# Patient Record
Sex: FEMALE | Race: WHITE | NOT HISPANIC OR LATINO | Employment: UNEMPLOYED | URBAN - METROPOLITAN AREA
[De-identification: names, ages, dates, MRNs, and addresses within clinical notes are randomized per-mention and may not be internally consistent; named-entity substitution may affect disease eponyms.]

---

## 2020-10-12 LAB
ABO GROUP BLD: NORMAL
HBV SURFACE AG SERPL QL IA: NEGATIVE
HIV 1+2 AB+HIV1 P24 AG SERPL QL IA: NORMAL
RH BLD: NORMAL
RUBV IGG SERPL IA-ACNC: NORMAL
TREPONEMA PALLIDUM IGG+IGM AB [PRESENCE] IN SERUM OR PLASMA BY IMMUNOASSAY: NORMAL

## 2021-01-16 LAB
GLUCOSE 1H P CHAL SERPL-MCNC: 116 MG/DL
GLUCOSE 2H P CHAL SERPL-MCNC: 125 MG/DL
GLUCOSE 3H P CHAL SERPL-MCNC: 108 MG/DL
GLUCOSE TOL INTERP 786: 76

## 2021-04-13 LAB — STREP GP B DNA PCR: NEGATIVE

## 2021-04-29 ENCOUNTER — HOSPITAL ENCOUNTER (INPATIENT)
Facility: MEDICAL CENTER | Age: 31
LOS: 2 days | End: 2021-05-01
Attending: OBSTETRICS & GYNECOLOGY | Admitting: OBSTETRICS & GYNECOLOGY
Payer: COMMERCIAL

## 2021-04-29 ENCOUNTER — ANESTHESIA (OUTPATIENT)
Dept: ANESTHESIOLOGY | Facility: MEDICAL CENTER | Age: 31
End: 2021-04-29
Payer: COMMERCIAL

## 2021-04-29 ENCOUNTER — ANESTHESIA EVENT (OUTPATIENT)
Dept: ANESTHESIOLOGY | Facility: MEDICAL CENTER | Age: 31
End: 2021-04-29
Payer: COMMERCIAL

## 2021-04-29 ENCOUNTER — APPOINTMENT (OUTPATIENT)
Dept: OBGYN | Facility: MEDICAL CENTER | Age: 31
End: 2021-04-29
Attending: OBSTETRICS & GYNECOLOGY
Payer: COMMERCIAL

## 2021-04-29 LAB
BASOPHILS # BLD AUTO: 0.1 % (ref 0–1.8)
BASOPHILS # BLD: 0.01 K/UL (ref 0–0.12)
COMMENT 1642: NORMAL
EOSINOPHIL # BLD AUTO: 0.03 K/UL (ref 0–0.51)
EOSINOPHIL NFR BLD: 0.4 % (ref 0–6.9)
ERYTHROCYTE [DISTWIDTH] IN BLOOD BY AUTOMATED COUNT: 44.6 FL (ref 35.9–50)
HCT VFR BLD AUTO: 37.8 % (ref 37–47)
HGB BLD-MCNC: 11.4 G/DL (ref 12–16)
HOLDING TUBE BB 8507: NORMAL
IMM GRANULOCYTES # BLD AUTO: 0.04 K/UL (ref 0–0.11)
IMM GRANULOCYTES NFR BLD AUTO: 0.6 % (ref 0–0.9)
LYMPHOCYTES # BLD AUTO: 1.39 K/UL (ref 1–4.8)
LYMPHOCYTES NFR BLD: 19.2 % (ref 22–41)
MCH RBC QN AUTO: 24.9 PG (ref 27–33)
MCHC RBC AUTO-ENTMCNC: 30.2 G/DL (ref 33.6–35)
MCV RBC AUTO: 82.7 FL (ref 81.4–97.8)
MONOCYTES # BLD AUTO: 0.57 K/UL (ref 0–0.85)
MONOCYTES NFR BLD AUTO: 7.9 % (ref 0–13.4)
MORPHOLOGY BLD-IMP: NORMAL
NEUTROPHILS # BLD AUTO: 5.2 K/UL (ref 2–7.15)
NEUTROPHILS NFR BLD: 71.8 % (ref 44–72)
NRBC # BLD AUTO: 0 K/UL
NRBC BLD-RTO: 0 /100 WBC
PLATELET # BLD AUTO: 249 K/UL (ref 164–446)
PLATELET BLD QL SMEAR: NORMAL
PMV BLD AUTO: 10.2 FL (ref 9–12.9)
RBC # BLD AUTO: 4.57 M/UL (ref 4.2–5.4)
RBC BLD AUTO: NORMAL
SARS-COV+SARS-COV-2 AG RESP QL IA.RAPID: NOTDETECTED
SARS-COV-2 RNA RESP QL NAA+PROBE: NOTDETECTED
SPECIMEN SOURCE: NORMAL
SPECIMEN SOURCE: NORMAL
WBC # BLD AUTO: 7.2 K/UL (ref 4.8–10.8)

## 2021-04-29 PROCEDURE — 85025 COMPLETE CBC W/AUTO DIFF WBC: CPT

## 2021-04-29 PROCEDURE — 304965 HCHG RECOVERY SERVICES

## 2021-04-29 PROCEDURE — 10H07YZ INSERTION OF OTHER DEVICE INTO PRODUCTS OF CONCEPTION, VIA NATURAL OR ARTIFICIAL OPENING: ICD-10-PCS | Performed by: OBSTETRICS & GYNECOLOGY

## 2021-04-29 PROCEDURE — 700101 HCHG RX REV CODE 250: Performed by: STUDENT IN AN ORGANIZED HEALTH CARE EDUCATION/TRAINING PROGRAM

## 2021-04-29 PROCEDURE — 700111 HCHG RX REV CODE 636 W/ 250 OVERRIDE (IP): Performed by: OBSTETRICS & GYNECOLOGY

## 2021-04-29 PROCEDURE — 87426 SARSCOV CORONAVIRUS AG IA: CPT

## 2021-04-29 PROCEDURE — A9270 NON-COVERED ITEM OR SERVICE: HCPCS | Performed by: OBSTETRICS & GYNECOLOGY

## 2021-04-29 PROCEDURE — 36415 COLL VENOUS BLD VENIPUNCTURE: CPT

## 2021-04-29 PROCEDURE — U0005 INFEC AGEN DETEC AMPLI PROBE: HCPCS

## 2021-04-29 PROCEDURE — 700111 HCHG RX REV CODE 636 W/ 250 OVERRIDE (IP)

## 2021-04-29 PROCEDURE — 770002 HCHG ROOM/CARE - OB PRIVATE (112)

## 2021-04-29 PROCEDURE — U0003 INFECTIOUS AGENT DETECTION BY NUCLEIC ACID (DNA OR RNA); SEVERE ACUTE RESPIRATORY SYNDROME CORONAVIRUS 2 (SARS-COV-2) (CORONAVIRUS DISEASE [COVID-19]), AMPLIFIED PROBE TECHNIQUE, MAKING USE OF HIGH THROUGHPUT TECHNOLOGIES AS DESCRIBED BY CMS-2020-01-R: HCPCS

## 2021-04-29 PROCEDURE — 700102 HCHG RX REV CODE 250 W/ 637 OVERRIDE(OP): Performed by: OBSTETRICS & GYNECOLOGY

## 2021-04-29 PROCEDURE — 59409 OBSTETRICAL CARE: CPT

## 2021-04-29 PROCEDURE — 0KQM0ZZ REPAIR PERINEUM MUSCLE, OPEN APPROACH: ICD-10-PCS | Performed by: OBSTETRICS & GYNECOLOGY

## 2021-04-29 PROCEDURE — 700111 HCHG RX REV CODE 636 W/ 250 OVERRIDE (IP): Performed by: STUDENT IN AN ORGANIZED HEALTH CARE EDUCATION/TRAINING PROGRAM

## 2021-04-29 PROCEDURE — 700105 HCHG RX REV CODE 258: Performed by: OBSTETRICS & GYNECOLOGY

## 2021-04-29 PROCEDURE — 10907ZC DRAINAGE OF AMNIOTIC FLUID, THERAPEUTIC FROM PRODUCTS OF CONCEPTION, VIA NATURAL OR ARTIFICIAL OPENING: ICD-10-PCS | Performed by: OBSTETRICS & GYNECOLOGY

## 2021-04-29 RX ORDER — ACETAMINOPHEN 325 MG/1
325 TABLET ORAL EVERY 4 HOURS PRN
Status: DISCONTINUED | OUTPATIENT
Start: 2021-04-29 | End: 2021-05-01 | Stop reason: HOSPADM

## 2021-04-29 RX ORDER — MISOPROSTOL 200 UG/1
800 TABLET ORAL
Status: DISCONTINUED | OUTPATIENT
Start: 2021-04-29 | End: 2021-04-30 | Stop reason: HOSPADM

## 2021-04-29 RX ORDER — ROPIVACAINE HYDROCHLORIDE 2 MG/ML
INJECTION, SOLUTION EPIDURAL; INFILTRATION
Status: DISCONTINUED | OUTPATIENT
Start: 2021-04-29 | End: 2021-04-29 | Stop reason: SURG

## 2021-04-29 RX ORDER — LIDOCAINE HYDROCHLORIDE AND EPINEPHRINE 15; 5 MG/ML; UG/ML
INJECTION, SOLUTION EPIDURAL
Status: COMPLETED | OUTPATIENT
Start: 2021-04-29 | End: 2021-04-29

## 2021-04-29 RX ORDER — SODIUM CHLORIDE, SODIUM LACTATE, POTASSIUM CHLORIDE, AND CALCIUM CHLORIDE .6; .31; .03; .02 G/100ML; G/100ML; G/100ML; G/100ML
250 INJECTION, SOLUTION INTRAVENOUS PRN
Status: DISCONTINUED | OUTPATIENT
Start: 2021-04-29 | End: 2021-04-30 | Stop reason: HOSPADM

## 2021-04-29 RX ORDER — ROPIVACAINE HYDROCHLORIDE 2 MG/ML
INJECTION, SOLUTION EPIDURAL; INFILTRATION; PERINEURAL
Status: COMPLETED
Start: 2021-04-29 | End: 2021-04-29

## 2021-04-29 RX ORDER — ROPIVACAINE HYDROCHLORIDE 2 MG/ML
INJECTION, SOLUTION EPIDURAL; INFILTRATION PRN
Status: DISCONTINUED | OUTPATIENT
Start: 2021-04-29 | End: 2021-04-29

## 2021-04-29 RX ORDER — ONDANSETRON 4 MG/1
4 TABLET, ORALLY DISINTEGRATING ORAL EVERY 6 HOURS PRN
Status: DISCONTINUED | OUTPATIENT
Start: 2021-04-29 | End: 2021-04-30

## 2021-04-29 RX ORDER — SODIUM CHLORIDE, SODIUM LACTATE, POTASSIUM CHLORIDE, CALCIUM CHLORIDE 600; 310; 30; 20 MG/100ML; MG/100ML; MG/100ML; MG/100ML
INJECTION, SOLUTION INTRAVENOUS CONTINUOUS
Status: ACTIVE | OUTPATIENT
Start: 2021-04-29 | End: 2021-04-29

## 2021-04-29 RX ORDER — OXYCODONE HYDROCHLORIDE 5 MG/1
5 TABLET ORAL EVERY 6 HOURS PRN
Status: DISCONTINUED | OUTPATIENT
Start: 2021-04-29 | End: 2021-05-01 | Stop reason: HOSPADM

## 2021-04-29 RX ORDER — SODIUM CHLORIDE, SODIUM LACTATE, POTASSIUM CHLORIDE, AND CALCIUM CHLORIDE .6; .31; .03; .02 G/100ML; G/100ML; G/100ML; G/100ML
1000 INJECTION, SOLUTION INTRAVENOUS
Status: DISCONTINUED | OUTPATIENT
Start: 2021-04-29 | End: 2021-04-30 | Stop reason: HOSPADM

## 2021-04-29 RX ORDER — IBUPROFEN 600 MG/1
600 TABLET ORAL EVERY 6 HOURS PRN
Status: DISCONTINUED | OUTPATIENT
Start: 2021-04-29 | End: 2021-05-01 | Stop reason: HOSPADM

## 2021-04-29 RX ORDER — ONDANSETRON 2 MG/ML
4 INJECTION INTRAMUSCULAR; INTRAVENOUS EVERY 6 HOURS PRN
Status: DISCONTINUED | OUTPATIENT
Start: 2021-04-29 | End: 2021-04-30

## 2021-04-29 RX ORDER — ROPIVACAINE HYDROCHLORIDE 2 MG/ML
INJECTION, SOLUTION EPIDURAL; INFILTRATION; PERINEURAL CONTINUOUS
Status: DISCONTINUED | OUTPATIENT
Start: 2021-04-29 | End: 2021-04-30

## 2021-04-29 RX ORDER — TERBUTALINE SULFATE 1 MG/ML
0.25 INJECTION, SOLUTION SUBCUTANEOUS PRN
Status: DISCONTINUED | OUTPATIENT
Start: 2021-04-29 | End: 2021-04-30 | Stop reason: HOSPADM

## 2021-04-29 RX ORDER — DEXTROSE, SODIUM CHLORIDE, SODIUM LACTATE, POTASSIUM CHLORIDE, AND CALCIUM CHLORIDE 5; .6; .31; .03; .02 G/100ML; G/100ML; G/100ML; G/100ML; G/100ML
INJECTION, SOLUTION INTRAVENOUS CONTINUOUS
Status: DISCONTINUED | OUTPATIENT
Start: 2021-04-29 | End: 2021-04-30

## 2021-04-29 RX ADMIN — OXYCODONE HYDROCHLORIDE 5 MG: 5 TABLET ORAL at 22:47

## 2021-04-29 RX ADMIN — OXYTOCIN 125 ML/HR: 10 INJECTION, SOLUTION INTRAMUSCULAR; INTRAVENOUS at 21:40

## 2021-04-29 RX ADMIN — ROPIVACAINE HYDROCHLORIDE 200 MG: 2 INJECTION, SOLUTION EPIDURAL; INFILTRATION at 11:24

## 2021-04-29 RX ADMIN — OXYTOCIN 2000 ML/HR: 10 INJECTION, SOLUTION INTRAMUSCULAR; INTRAVENOUS at 21:19

## 2021-04-29 RX ADMIN — ROPIVACAINE HYDROCHLORIDE 12 ML/HR: 2 INJECTION, SOLUTION EPIDURAL; INFILTRATION at 11:06

## 2021-04-29 RX ADMIN — IBUPROFEN 600 MG: 600 TABLET ORAL at 21:39

## 2021-04-29 RX ADMIN — SODIUM CHLORIDE, POTASSIUM CHLORIDE, SODIUM LACTATE AND CALCIUM CHLORIDE: 600; 310; 30; 20 INJECTION, SOLUTION INTRAVENOUS at 09:56

## 2021-04-29 RX ADMIN — LIDOCAINE HYDROCHLORIDE,EPINEPHRINE BITARTRATE 3 ML: 15; .005 INJECTION, SOLUTION EPIDURAL; INFILTRATION; INTRACAUDAL; PERINEURAL at 11:06

## 2021-04-29 RX ADMIN — ROPIVACAINE HYDROCHLORIDE: 2 INJECTION, SOLUTION EPIDURAL; INFILTRATION at 19:16

## 2021-04-29 RX ADMIN — SODIUM CHLORIDE, POTASSIUM CHLORIDE, SODIUM LACTATE AND CALCIUM CHLORIDE: 600; 310; 30; 20 INJECTION, SOLUTION INTRAVENOUS at 11:05

## 2021-04-29 RX ADMIN — SODIUM CHLORIDE, POTASSIUM CHLORIDE, SODIUM LACTATE AND CALCIUM CHLORIDE: 600; 310; 30; 20 INJECTION, SOLUTION INTRAVENOUS at 15:06

## 2021-04-29 RX ADMIN — ACETAMINOPHEN 325 MG: 325 TABLET ORAL at 18:08

## 2021-04-29 RX ADMIN — OXYTOCIN 2 MILLI-UNITS/MIN: 10 INJECTION, SOLUTION INTRAMUSCULAR; INTRAVENOUS at 09:56

## 2021-04-29 RX ADMIN — SODIUM CHLORIDE, SODIUM LACTATE, POTASSIUM CHLORIDE, CALCIUM CHLORIDE AND DEXTROSE MONOHYDRATE: 5; 600; 310; 30; 20 INJECTION, SOLUTION INTRAVENOUS at 18:09

## 2021-04-29 ASSESSMENT — LIFESTYLE VARIABLES
CONSUMPTION TOTAL: NEGATIVE
TOTAL SCORE: 0
ALCOHOL_USE: NO
HOW MANY TIMES IN THE PAST YEAR HAVE YOU HAD 5 OR MORE DRINKS IN A DAY: 0
ON A TYPICAL DAY WHEN YOU DRINK ALCOHOL HOW MANY DRINKS DO YOU HAVE: 0
TOTAL SCORE: 0
EVER FELT BAD OR GUILTY ABOUT YOUR DRINKING: NO
HAVE PEOPLE ANNOYED YOU BY CRITICIZING YOUR DRINKING: NO
EVER HAD A DRINK FIRST THING IN THE MORNING TO STEADY YOUR NERVES TO GET RID OF A HANGOVER: NO
AVERAGE NUMBER OF DAYS PER WEEK YOU HAVE A DRINK CONTAINING ALCOHOL: 0
TOTAL SCORE: 0
HAVE YOU EVER FELT YOU SHOULD CUT DOWN ON YOUR DRINKING: NO
EVER_SMOKED: NEVER
DOES PATIENT WANT TO STOP DRINKING: NO

## 2021-04-29 ASSESSMENT — PATIENT HEALTH QUESTIONNAIRE - PHQ9
SUM OF ALL RESPONSES TO PHQ9 QUESTIONS 1 AND 2: 0
2. FEELING DOWN, DEPRESSED, IRRITABLE, OR HOPELESS: NOT AT ALL
1. LITTLE INTEREST OR PLEASURE IN DOING THINGS: NOT AT ALL

## 2021-04-29 ASSESSMENT — PAIN DESCRIPTION - PAIN TYPE
TYPE: ACUTE PAIN
TYPE: ACUTE PAIN

## 2021-04-29 ASSESSMENT — COPD QUESTIONNAIRES
IN THE PAST 12 MONTHS DO YOU DO LESS THAN YOU USED TO BECAUSE OF YOUR BREATHING PROBLEMS: DISAGREE/UNSURE
DO YOU EVER COUGH UP ANY MUCUS OR PHLEGM?: NO/ONLY WITH OCCASIONAL COLDS OR INFECTIONS
COPD SCREENING SCORE: 0
HAVE YOU SMOKED AT LEAST 100 CIGARETTES IN YOUR ENTIRE LIFE: NO/DON'T KNOW
DURING THE PAST 4 WEEKS HOW MUCH DID YOU FEEL SHORT OF BREATH: NONE/LITTLE OF THE TIME

## 2021-04-29 NOTE — ANESTHESIA PREPROCEDURE EVALUATION
Anes H&P:  PAST MEDICAL HISTORY:   30 y.o. female who presents for .  She has current and past medical problems significant for:    No past medical history on file.    SMOKING/ALCOHOL/RECREATIONAL DRUG USE:  Social History     Tobacco Use   • Smoking status: Not on file   Substance Use Topics   • Alcohol use: Not on file   • Drug use: Not on file     Social History     Substance and Sexual Activity   Drug Use Not on file       PAST SURGICAL HISTORY:  No past surgical history on file.    ALLERGIES:   Allergies   Allergen Reactions   • Sulfa Drugs Hives       MEDICATIONS:  No current facility-administered medications on file prior to encounter.     No current outpatient medications on file prior to encounter.       LABS:  Lab Results   Component Value Date/Time    HEMOGLOBIN 11.4 (L) 04/29/2021 0850    HEMATOCRIT 37.8 04/29/2021 0850    WBC 7.2 04/29/2021 0850     No results found for: SODIUM, POTASSIUM, CHLORIDE, CO2, GLUCOSE, BUN, CALCIUM    SARS-CoV2 result: Negative      PREVIOUS ANESTHETICS: See EMR  __________________________________________      Relevant Problems   No relevant active problems       Physical Exam    Airway   Mallampati: II  TM distance: >3 FB  Neck ROM: full       Cardiovascular - normal exam  Rhythm: regular  Rate: normal  (-) murmur     Dental - normal exam           Pulmonary - normal exam  Breath sounds clear to auscultation     Abdominal    Neurological - normal exam                 Anesthesia Plan    ASA 2       Plan - epidural   Neuraxial block will be labor analgesia                  Pertinent diagnostic labs and testing reviewed    Informed Consent:    Anesthetic plan and risks discussed with patient.

## 2021-04-29 NOTE — PROGRESS NOTES
0824. Pt here for elective IOL at 39.1 weeks. . GBS negative. Vitals taken, assessment done. SVE 2/70/-2.     0956. Pitocin started at 2 mu/hr.    1103. Dr. Estrada at the bedside. Epidural placed.    1253. Dr. Back at the bedside, SVE 2/70/-2. AROM clear fluid.    1800. Dr. Back notified of SVE 2-3/70/-2. Pit at 22 mu/hr. Orders to not increase. She will recheck the pt in 2 hours.    1900. Report to Chantelle EVERETT. POC discussed.

## 2021-04-29 NOTE — ANESTHESIA PROCEDURE NOTES
Epidural Block    Date/Time: 4/29/2021 11:06 AM  Performed by: Vijay Estrada M.D.  Authorized by: Vijay Estrada M.D.     Patient Location:  OB  Start Time:  4/29/2021 11:06 AM  End Time:  4/29/2021 11:10 AM  Reason for Block: labor analgesia    patient identified, IV checked, site marked, risks and benefits discussed, surgical consent, monitors and equipment checked, pre-op evaluation and timeout performed    Patient Position:  Sitting  Prep: ChloraPrep, patient draped and sterile technique    Monitoring:  Blood pressure, continuous pulse oximetry and heart rate  Approach:  Midline  Location:  L3-L4  Injection Technique:  GEE saline  Skin infiltration:  Lidocaine  Strength:  1%  Dose:  3ml  Needle Type:  Tuohy  Needle Gauge:  17 G  Needle Length:  3.5 in  Loss of resistance::  7  Catheter Size:  19 G  Catheter at Skin Depth:  12  Test Dose Result:  Negative

## 2021-04-29 NOTE — CARE PLAN
Problem: Knowledge Deficit  Goal: Patient/Support person demonstrates understanding regarding the progression of labor, available options and participates in decision-making process  Outcome: PROGRESSING AS EXPECTED  Note: Pt and FOB are up to date on the POC. She will ask questions as needed and the RN will notify her of any changes.     Problem: Pain  Goal: Alleviation of Pain or a reduction in pain to the patient's comfort goal  Outcome: PROGRESSING AS EXPECTED  Note: Pt has an epidural in place that is working. She will notify the RN if she is having breakthrough pain.

## 2021-04-29 NOTE — PROGRESS NOTES
SVE 2/70/-2 with clear fluid. IUPC placed. Will continue to augment with Pitocin. Repeat SVE in 4 hours. T Cat 1    Balbina Back M.D.

## 2021-04-30 LAB
ERYTHROCYTE [DISTWIDTH] IN BLOOD BY AUTOMATED COUNT: 45.1 FL (ref 35.9–50)
HCT VFR BLD AUTO: 32 % (ref 37–47)
HGB BLD-MCNC: 9.6 G/DL (ref 12–16)
MCH RBC QN AUTO: 24.7 PG (ref 27–33)
MCHC RBC AUTO-ENTMCNC: 30 G/DL (ref 33.6–35)
MCV RBC AUTO: 82.5 FL (ref 81.4–97.8)
PLATELET # BLD AUTO: 217 K/UL (ref 164–446)
PMV BLD AUTO: 9.7 FL (ref 9–12.9)
RBC # BLD AUTO: 3.88 M/UL (ref 4.2–5.4)
WBC # BLD AUTO: 14.3 K/UL (ref 4.8–10.8)

## 2021-04-30 PROCEDURE — 303615 HCHG EPIDURAL/SPINAL ANESTHESIA FOR LABOR

## 2021-04-30 PROCEDURE — 85027 COMPLETE CBC AUTOMATED: CPT

## 2021-04-30 PROCEDURE — A9270 NON-COVERED ITEM OR SERVICE: HCPCS | Performed by: OBSTETRICS & GYNECOLOGY

## 2021-04-30 PROCEDURE — 770002 HCHG ROOM/CARE - OB PRIVATE (112)

## 2021-04-30 PROCEDURE — 700102 HCHG RX REV CODE 250 W/ 637 OVERRIDE(OP): Performed by: OBSTETRICS & GYNECOLOGY

## 2021-04-30 PROCEDURE — 36415 COLL VENOUS BLD VENIPUNCTURE: CPT

## 2021-04-30 RX ORDER — VITAMIN A ACETATE, BETA CAROTENE, ASCORBIC ACID, CHOLECALCIFEROL, .ALPHA.-TOCOPHEROL ACETATE, DL-, THIAMINE MONONITRATE, RIBOFLAVIN, NIACINAMIDE, PYRIDOXINE HYDROCHLORIDE, FOLIC ACID, CYANOCOBALAMIN, CALCIUM CARBONATE, FERROUS FUMARATE, ZINC OXIDE, CUPRIC OXIDE 3080; 12; 120; 400; 1; 1.84; 3; 20; 22; 920; 25; 200; 27; 10; 2 [IU]/1; UG/1; MG/1; [IU]/1; MG/1; MG/1; MG/1; MG/1; MG/1; [IU]/1; MG/1; MG/1; MG/1; MG/1; MG/1
1 TABLET, FILM COATED ORAL
Status: DISCONTINUED | OUTPATIENT
Start: 2021-04-30 | End: 2021-05-01 | Stop reason: HOSPADM

## 2021-04-30 RX ORDER — MISOPROSTOL 200 UG/1
600 TABLET ORAL
Status: DISCONTINUED | OUTPATIENT
Start: 2021-04-30 | End: 2021-05-01 | Stop reason: HOSPADM

## 2021-04-30 RX ORDER — SODIUM CHLORIDE, SODIUM LACTATE, POTASSIUM CHLORIDE, CALCIUM CHLORIDE 600; 310; 30; 20 MG/100ML; MG/100ML; MG/100ML; MG/100ML
INJECTION, SOLUTION INTRAVENOUS PRN
Status: DISCONTINUED | OUTPATIENT
Start: 2021-04-30 | End: 2021-05-01 | Stop reason: HOSPADM

## 2021-04-30 RX ORDER — DOCUSATE SODIUM 100 MG/1
100 CAPSULE, LIQUID FILLED ORAL 2 TIMES DAILY PRN
Status: DISCONTINUED | OUTPATIENT
Start: 2021-04-30 | End: 2021-05-01 | Stop reason: HOSPADM

## 2021-04-30 RX ADMIN — OXYCODONE HYDROCHLORIDE 5 MG: 5 TABLET ORAL at 06:15

## 2021-04-30 RX ADMIN — OXYCODONE HYDROCHLORIDE 5 MG: 5 TABLET ORAL at 10:32

## 2021-04-30 RX ADMIN — IBUPROFEN 600 MG: 600 TABLET ORAL at 19:06

## 2021-04-30 RX ADMIN — PRENATAL WITH FERROUS FUM AND FOLIC ACID 1 TABLET: 3080; 920; 120; 400; 22; 1.84; 3; 20; 10; 1; 12; 200; 27; 25; 2 TABLET ORAL at 10:32

## 2021-04-30 RX ADMIN — DOCUSATE SODIUM 100 MG: 100 CAPSULE, LIQUID FILLED ORAL at 04:35

## 2021-04-30 RX ADMIN — IBUPROFEN 600 MG: 600 TABLET ORAL at 04:35

## 2021-04-30 RX ADMIN — DOCUSATE SODIUM 100 MG: 100 CAPSULE, LIQUID FILLED ORAL at 10:38

## 2021-04-30 RX ADMIN — IBUPROFEN 600 MG: 600 TABLET ORAL at 10:32

## 2021-04-30 ASSESSMENT — EDINBURGH POSTNATAL DEPRESSION SCALE (EPDS)
I HAVE BEEN SO UNHAPPY THAT I HAVE BEEN CRYING: YES, QUITE OFTEN
I HAVE BEEN ANXIOUS OR WORRIED FOR NO GOOD REASON: YES, VERY OFTEN
THE THOUGHT OF HARMING MYSELF HAS OCCURRED TO ME: NEVER
I HAVE LOOKED FORWARD WITH ENJOYMENT TO THINGS: DEFINITELY LESS THAN I USED TO
THINGS HAVE BEEN GETTING ON TOP OF ME: YES, MOST OF THE TIME I HAVEN'T BEEN ABLE TO COPE AT ALL
I HAVE FELT SAD OR MISERABLE: YES, MOST OF THE TIME
I HAVE BEEN ABLE TO LAUGH AND SEE THE FUNNY SIDE OF THINGS: NOT QUITE SO MUCH NOW
I HAVE BLAMED MYSELF UNNECESSARILY WHEN THINGS WENT WRONG: YES, MOST OF THE TIME
I HAVE FELT SCARED OR PANICKY FOR NO GOOD REASON: YES, QUITE A LOT
I HAVE BEEN SO UNHAPPY THAT I HAVE HAD DIFFICULTY SLEEPING: YES, SOMETIMES

## 2021-04-30 ASSESSMENT — PAIN DESCRIPTION - PAIN TYPE
TYPE: ACUTE PAIN

## 2021-04-30 ASSESSMENT — PAIN SCALES - GENERAL: PAIN_LEVEL: 6

## 2021-04-30 NOTE — PROGRESS NOTES
1915 - Assumed care of pt. Report received from Jim EVERETT.   1921 - Dr. Estrada updated on pt pain. MD to bedside for bolus.  2004 - Dr. Back at bedside. SVE: 4-5/70/-2.   2015 - Dr. Estrada updated on pt pain no improving after bolus, orders to increase ropivacaine to 12ml/hr and encourage to use bolus button.   2042 - Pt feeling increased pressure. SVE: 7-8/80/-1. Dr Back updated.   2059 - SVE: Ant - Lip. Dr. Back called and updated on Variable decelerations down to 70s. MD coming to bedside.   2104 - Dr. Back at bedside. SVE: Complete.  2105 - Pt began pushing with MD.   2116 - Del of viable infant female. Apgars 8/9. Nuchal x1.   0015 - Pt up to bathroom. Steady, able to void. Pt taken up to postpartum. Report given to Wilber EVERETT.

## 2021-04-30 NOTE — CONSULTS
MOB stated she has decided to bottle feed infant formula only.  Verbal instructions on drying measures for milk supply provided to MOB and was encouraged to pump and/or hand express to comfort only to release milk from breasts, if needed, for possible surge in milk supply which may cause her discomfort from full breasts.    Also, referred MOB to utilize the website, [email or web address], for further drying measures of milk supply and for written instructions on the information provided to her by this LC.    MOB verbalized understanding and denied having any further lactation questions and/or concerns at this time.

## 2021-04-30 NOTE — ANESTHESIA TIME REPORT
Anesthesia Start and Stop Event Times     Date Time Event    4/29/2021 1100 Ready for Procedure     1100 Anesthesia Start     2116 Anesthesia Stop        Responsible Staff  04/29/21    Name Role Begin End    Vijay Estrada M.D. Anesth 1100 2116        Preop Diagnosis (Free Text):  Pre-op Diagnosis             Preop Diagnosis (Codes):    Post op Diagnosis  Pain during labor      Premium Reason  A. 3PM - 7AM    Comments:

## 2021-04-30 NOTE — CARE PLAN
Problem: Communication  Goal: The ability to communicate needs accurately and effectively will improve  Outcome: PROGRESSING AS EXPECTED  Intervention: [place] patient and significant other/support system to call light to alert staff of needs  Flowsheets (Taken 4/30/2021 0312)  Oriented to::   All of the Following : Location of Bathroom, Visiting Policy, Unit Routine, Call Light and Bedside Controls, Bedside Rail Policy, Smoking Policy, Rights and Responsibilities, Bedside Report, and Patient Education Notebook   Unit Routine   Location of bathroom   Call Light & Bedside Controls   Visiting Policy   Bedside Rail Policy   Bedside Report  Note: Pt and SO oriented to room, unit, plan of care, call light, infant safety and security, questions answered     Problem: Altered physiologic condition related to immediate post-delivery state and potential for bleeding/hemorrhage  Goal: Patient physiologically stable as evidenced by normal lochia, palpable uterine involution and vital signs within normal limits  Outcome: PROGRESSING AS EXPECTED  Intervention: Perform physical assessment and obtain vital signs on patient as directed in Intrapartum/Postpartum Standard of Care in Policy and Procedure manual  Note: Fundus firm, lochia light without clots expressed, pt educated in normal postpartum vaginal bleeding, pad changes, and julius care, questions answered

## 2021-04-30 NOTE — ANESTHESIA POSTPROCEDURE EVALUATION
Patient: Kenia Daniel    Procedure Summary     Date: 04/29/21 Room / Location:     Anesthesia Start: 1100 Anesthesia Stop: 2116    Procedure: Labor Epidural Diagnosis:     Scheduled Providers:  Responsible Provider: Vijay Estrada M.D.    Anesthesia Type: epidural ASA Status: 2          Final Anesthesia Type: epidural  Last vitals  BP   Blood Pressure: 105/65    Temp   36.6 °C (97.8 °F)    Pulse   74   Resp   18    SpO2   99 %      Anesthesia Post Evaluation    Patient location during evaluation: PACU  Patient participation: complete - patient participated  Level of consciousness: awake and alert  Pain score: 6    Airway patency: patent  Anesthetic complications: no  Cardiovascular status: hemodynamically stable  Respiratory status: acceptable  Hydration status: euvolemic    PONV: none          No complications documented.     Nurse Pain Score: 6 (NPRS)

## 2021-04-30 NOTE — PROGRESS NOTES
Obstetrics  Postpartum Progress Note    Events  No acute events     Subjective:  Doing well. Reports pain is controlled. Lochia minimal. Ambulating. Voiding without difficulty. + Flatus. No bowel movement. Denies headaches or changes in vision. Breastfeeding.     PHYSICAL EXAM:  Vitals:   Vitals:    21 0615   BP: 106/61   Pulse: 69   Resp: 18   Temp: 36.6 °C (97.9 °F)   SpO2: 96%   General: Alert, conversational, pleasant, no acute distress  CVS: Regular rate  PULM: No respiratory distress, symmetric expansion  ABD: Soft, non-tender, non-distended, fundus firm, non-tender, below the umbilicus  : Deferred  Extremities: Moves all, trace edema     Labs Reviewed and Significant for:  a.m. CBC pending    Assessment and Plan:    Kenia Daniel is a 30 y.o.  s/p , PPD#1    ASSESSMENT:  1. Postpartum sate    PLAN:  - Continue routine postpartum care.   - Anticipate D/C in a.m. Infant with temperature issues and patient lives in [place] and delivered at 9pm.     Balbina Back M.D.

## 2021-04-30 NOTE — PROGRESS NOTES
Pt arrived to room 316 from L&D via wheelchair transport. Transferred to bed without difficulty. Fundus firm, lochia light without clots expressed. Pt educated on pad changes and julius care, LR with pitocin infusing. Pt and SO oriented to room and unit, updated on plan of care and safety precautions. Questions answered and pt verbalizes understanding. Siderails up x2, bed level down, call light within reach, no further questions or concerns at this time. Wilber Daniel R.N.

## 2021-04-30 NOTE — L&D DELIVERY NOTE
Date: 2021    PREOPERATIVE DIAGNOSIS:   1.  Term intrauterine pregnancy at 39 weeks and 1 day.  2.  History of  birth at 36 weeks.  3.  Vitamin D deficient 26.7.  4.  Latent labor    POSTOPERATIVE DIAGNOSIS: same as above    PROCEDURE: Normal spontaneous vaginal delivery    Primary OB/GYN: Balbina Back M.D.    Delivering Physician: Balbina Back M.D.     Anesthesia: Epidural     Complications: None    Estimated blood loss: 150 ml    PROCEDURE DETAILS:   30 y.o.  ,now  was admitted for an induction of labor. She was 2 cm dilated and was augmented with Pitocin. She underwent AROM with clear fluid noted. She received and epidural. She progressed along a normal labor curve and achieved complete cervical dilation. She pushed 4 times and delivered vaginally under epidural anesthesia. Due to poor maternal effort the patient had to push 3-4 times to deliver the fetal shoulders. There was a nuchal cord x 1. The infant was placed on the patient's abdomen after delivery. The cord was clamped after a 30 second delay and subsequently cut by the father of the baby. The fundus was firm with massage and IV pitocin. The placenta delivered spontaneously, intact, with normal 3-vessel cord, in [place] presentation.     There were no cervical or vaginal lacerations. There was a second degree perineal lacerations. Viable female infant weighs 3,170g (6lbs, 15.8oz.) Apgars were 8 and 9 at 1 and 5 minutes of life.    Mother and infant are recovering and doing well at this time. Sponge and needle counts were correct x 1.     Balbina Back M.D.

## 2021-04-30 NOTE — PROGRESS NOTES
SVE 4-5/70/-2, patient very uncomfortable. FHT Cat 1. Repeat SVE in 2 hour or if patient feels pressure.     Balbina Back M.D.

## 2021-04-30 NOTE — PROGRESS NOTES
SVE 3/70/-2 per RN. Pit at 22. Discussed not to increase any more. Will recheck in 2 hours. T Cat 2, x 1 variable.     Balbina Back M.D.

## 2021-04-30 NOTE — H&P
DATE OF SERVICE:  2021     PATIENT IDENTIFICATION:   A 30-year-old  5, para 2-0-2-2 at 39 weeks   and 1 day by last menstrual period, EDC 2021.     CHIEF COMPLAINT:  Scheduled elective induction of labor.     HISTORY OF PRESENT ILLNESS:  The patient has prenatal care with myself.  Her   pregnancy has been uncomplicated.  She desired an elective induction of labor   secondary to rapid labor and living in [employer].  She does have a history of   a prior  birth at 36 weeks and declined [facility].  The patient was seen in my office two days ago and she was   1-2 cm dilated.  She endorses positive fetal movement.  She denies vaginal   bleeding or loss of fluid.  She endorses occasional contractions.     REVIEW OF SYSTEMS:  Denies fevers, chills, shortness of breath, chest pain,   nausea, vomiting, diarrhea or dysuria.     PAST MEDICAL HISTORY:  Denies.     PAST SURGICAL HISTORY:  Tonsillectomy and dilation and curettage.     MEDICATIONS:  Prenatal vitamins.     SOCIAL HISTORY:  Denies tobacco use, alcohol use, or drug use.  Father of the   baby, Best.     GYNECOLOGIC HISTORY:  Last menstrual period 2020.  Denies history of   sexually transmitted infections or genital herpes.     OBSTETRICAL HISTORY:  G1 spontaneous miscarriage.  G2 in 2016, normal   spontaneous vaginal delivery, male, 6 pounds 5 ounces.  G3, in 2018, normal   spontaneous vaginal delivery, female, 7 pounds 15 ounces.  G4, spontaneous   miscarriage.  G5, current prenatal care with myself.     ALLERGIES:  SULFA.     FAMILY HISTORY:  Noncontributory.     PHYSICAL EXAMINATION:   VITAL SIGNS:  Temperature 98, pulse 81, respiratory rate 18, blood pressure   137/76, pulse ox 99% on room air.  GENERAL:  Alert, conversational, pleasant, no acute distress.  HEENT:  Moist mucous membranes.  CARDIOVASCULAR:  Regular rate.  PULMONARY:  No respiratory distress.  CHEST:  Symmetric expansion.  ABDOMEN:   Soft, nontender, nondistended, gravid.  GENITOURINARY:  A 2 cm, 70% effaced, -2 fetal station per RN.  EXTREMITIES:  Moves all, no edema.     LABORATORY STUDIES:  Prenatal labs reviewed, O positive, antibody negative,   HIV nonreactive, rubella immune, RPR nonreactive, hepatitis C antibody   nonreactive, hepatitis B surface antigen negative.  Gonorrhea and chlamydia   testing negative.  Vitamin D 31.3.  Urine culture polymicrobial growth.    One-hour glucose challenge test 146.  Vitamin D 26.7.  Three-hour glucose   tolerance test normal.  GBS negative.     IMAGING:  Anatomy ultrasound report shows a single live intrauterine pregnancy   with no placenta previa, female, baby girl Mj.     ASSESSMENT:  A 30-year-old  5, para 2-0-2-2 at 39 weeks and 1 day by   last menstrual period, EDC 2021.    1.  Term intrauterine pregnancy at 39 weeks and 1 day.  2.  History of  birth at 36 weeks.  3.  Vitamin D deficient 26.7.  4.  Latent labor.     PLAN:  1.  Admit to L and D.  2.  CBC, type and screen.  3.  Clear liquid diet.  4.  Continuous fetal heart tracing/tocometer.  5.  IV fluids 125 mL an hour.  6.  IV fentanyl versus epidural as needed for pain.  7.  We will augment with Pitocin followed by AROM.        ______________________________  MD ZAK Ceja/LEI    DD:  2021 14:10  DT:  2021 18:09    Job#:  [id number]

## 2021-04-30 NOTE — PROGRESS NOTES
Bedside report received from Lindsey Daniel RN Patient requests pain medication at next available time with current report of minimal cramping.    Chart check completed

## 2021-05-01 VITALS
OXYGEN SATURATION: 98 % | DIASTOLIC BLOOD PRESSURE: 63 MMHG | HEIGHT: 65 IN | RESPIRATION RATE: 19 BRPM | SYSTOLIC BLOOD PRESSURE: 101 MMHG | HEART RATE: 82 BPM | WEIGHT: 151 LBS | TEMPERATURE: 97.6 F | BODY MASS INDEX: 25.16 KG/M2

## 2021-05-01 PROCEDURE — A9270 NON-COVERED ITEM OR SERVICE: HCPCS | Performed by: OBSTETRICS & GYNECOLOGY

## 2021-05-01 PROCEDURE — 700102 HCHG RX REV CODE 250 W/ 637 OVERRIDE(OP): Performed by: OBSTETRICS & GYNECOLOGY

## 2021-05-01 RX ADMIN — OXYCODONE HYDROCHLORIDE 5 MG: 5 TABLET ORAL at 12:55

## 2021-05-01 RX ADMIN — PRENATAL WITH FERROUS FUM AND FOLIC ACID 1 TABLET: 3080; 920; 120; 400; 22; 1.84; 3; 20; 10; 1; 12; 200; 27; 25; 2 TABLET ORAL at 07:53

## 2021-05-01 RX ADMIN — IBUPROFEN 600 MG: 600 TABLET ORAL at 07:58

## 2021-05-01 RX ADMIN — IBUPROFEN 600 MG: 600 TABLET ORAL at 01:30

## 2021-05-01 RX ADMIN — OXYCODONE HYDROCHLORIDE 5 MG: 5 TABLET ORAL at 04:36

## 2021-05-01 ASSESSMENT — PAIN DESCRIPTION - PAIN TYPE
TYPE: ACUTE PAIN

## 2021-05-01 NOTE — PROGRESS NOTES
"Review of completed Depression scale with score of 22, social service conscult ordered per protocol, spoke with patient she reports \"pregnancy was hard\" with days of depression and feeling overwhelmed or \"not as happy\" currently she feels very happy and without feelings of depression. She states some post partum depression (mild) with first born child. Discussed post partum depression,  order and to call for assist as needed with understanding  "

## 2021-05-01 NOTE — CARE PLAN
Problem: Alteration in comfort related to episiotomy, vaginal repair and/or after birth pains  Goal: Patient verbalizes acceptable pain level  Outcome: PROGRESSING AS EXPECTED  Note: Pain being managed with PRN ibuprofen     Problem: Potential knowledge deficit related to lack of understanding of self and  care  Goal: Patient will demonstrate ability to care for self and infant  Outcome: PROGRESSING AS EXPECTED  Note: Patient cares for self and infant appropriately

## 2021-05-01 NOTE — PROGRESS NOTES
Assessment complete. Fundus firm, lochia light. Discussed POC for the night. All questions answered at this time. Call light within reach. Encouraged patient to call with any further questions or concerns.

## 2021-05-01 NOTE — DISCHARGE PLANNING
"Discharge Planning Assessment Post Partum    Reason for Referral: Scored a 22 on depression screening.   Address: [address] [place] [address] [place] [address].  Type of Living Situation:Lives with spouse and two children.  Mom Diagnosis: Term intrauterine pregnancy   Baby Diagnosis: Birth   Primary Language: English    Name of Baby: Williams Daniel  Father of the Baby: Best Daniel  Involved in baby’s care? Yes, at bedside.  Contact Information: [phone].    Prenatal Care: Yes, with Doctor Wong  Mom's PCP:  No, Educated on how to obtain a provider with insurance. MOB reports their previous PCP left the where they live  PCP for new baby:Niyah    Support System: Spouse and can talk to family if needed  Coping/Bonding between mother & baby: Yes, better than previous children and reports,  \"I actually want to hold her.\" MOB reports she believes she had post partum depression with her town other two children and thoughts it was \"normal\" to not want to hold her child with her first baby. This writer discussed what is a sign of post partum depression and provided information from [facility] on signs an symptoms of post partum depression.  This writer discussed with bedside RN of MOB's reported history of \"undiagnosed post partum depression\".   Source of Feeding: Bottle  Supplies for Infant: car seat, crib, bassinet, bottles, clothing, diapers, and etc.     Mom's Insurance: [place]  Baby Covered on Insurance: Will add baby.   Mother Employed/School: No, EUGENE works as a prisonguard.   Other children in the home/names & ages: Bebetoie age 3 and Will age 7.  Financial Hardship/Income: No   Mom's Mental status: Alert and oriented times 4. Mood/affect:euthymic. MOB denied suicidal or homicidal ideation and declined homicidal ideation towards baby.  Services used prior to admit: None.    CPS History: None,  Psychiatric History: MOB, reports a possible history of post partum depression and discussed she did not know what was \"normal\" and " "not \"normal.\" MOB reports she mentioned she did not want to hold her baby to her providers and no one seemed concerned. This writer briefly went over postpartum depression signs and symptoms and included FOB in discussion and resources and MOB thanked this writer for the resource. MOB educated to reach out to providers if needed and was given crisis line phone numbers.   Domestic Violence History: None  Drug/ETOH History: None    Resources Provided: Information from [facility] on signs an symptoms of post partum depression, [facility] for  mental health support group, and list of counseling resources. FOB informed MOB with their new insurance they get 12 weeks of free mental health services. MOB was engaging with resources and reports she is concerned she will feel the way she did before, but feels she is doing better this time.   Referrals Made: None.      Clearance for Discharge: MOB and baby are cleared by social work.            "

## 2021-05-01 NOTE — DISCHARGE INSTRUCTIONS
PATIENT DISCHARGE EDUCATION INSTRUCTION SHEET  REASONS TO CALL YOUR OBSTETRICIAN  · Persistent fever, shaking, chills (Temperature higher than 100.4) may indicate you have an infection  · Heavy bleeding: soaking more than 1 pad per hour; Passing clots an egg-sized clot or bigger may mean you have an postpartum hemorrhage  · Foul odor from vagina or bad smelling or discolored discharge or blood  · Breast infection (Mastitis symptoms); breast pain, chills, fever, redness or red streaks, may feel flu like symptoms  · Urinary pain, burning or frequency  · Incision that is not healing, increased redness, swelling, tenderness or pain, or any pus from episiotomy or  site may mean you have an infection  · Redness, swelling, warmth, or painful to touch in the calf area of your leg may mean you have a blood clot  · Severe or intensified depression, thoughts or feelings of wanting to hurt yourself or someone else   · Pain in chest, obstructed breathing or shortness of breath (trouble catching your breath) may mean you are having a postpartum complication. Call your provider immediately   · Headache that does not get better, even after taking medicine, a bad headache with vision changes or pain in the upper right area of your belly may mean you have high blood pressure or post birth preeclampsia. Call your provider immediately    HAND WASHING  All family and friends should wash their hands:  · Before and after holding the baby  · Before feeding the baby  · After using the restroom or changing the baby's diaper    Episiotomy/Laceration  · May use julius-spray bottle, witch hazel pads and dermaplast spray for comfort  · Use julius-spray bottle after urinating to cleanse perineal area  · To prevent burning during urination spray julius-water bottle on labial area   · Pat perineal area dry until episiotomy/laceration is healed  · Continue to use julius-bottle until bleeding stops as needed  · If have a 2nd degree laceration or  greater, a Sitz bath can offer relief from soreness, burning, and inflammation   · Sitz Bath   · Sit in 6 inches of warm water and soak laceration as needed until the laceration heals    VAGINAL CARE AND BLEEDING  · Nothing inside vagina for 6 weeks:   · No sexual intercourse, tampons or douching  · Bleeding may continue for 2-4 weeks. Amount and color may vary  · Soaking 1 pad or more in an hour for several hours is considered heavy bleeding  · Passing large egg sized blood clots can be concerning  · If you feel like you have heavy bleeding or are having increasing amount of blood clots call your Obstetrician immediately  · If you begin feeling faint upon standing, feeling sick to your stomach, have clammy skin, a really fast heartbeat, have chills, start feeling confused, dizzy, sleepy or weak, or feeling like you're going to faint call your Obstetrician immediately    HYPERTENSION   Preeclampsia or gestational hypertension are types of high blood pressure that only pregnant women can get. It is important for you to be aware of symptoms to seek early intervention and treatment. If you have any of these symptoms immediately call your Obstetrician    · Vision changes or blurred vision   · Severe headache or pain that is unrelieved with medication and will not go away  · Persistent pain in upper abdomen or shoulder   · Increased swelling of face, feet, or hands  · Difficulty breathing or shortness of breath at rest  · Urinating less than usual    URINATION AND BOWEL MOVEMENTS  · Eating more fiber (bran cereal, fruits, and vegetables) and drinking plenty of fluids will help to avoid constipation  · Urinary frequency and urgency after childbirth is normal  · If you experience any urinary pain, burning or frequency call your provider    BIRTH CONTROL  · It is possible to become pregnant at any time after delivery and while breastfeeding  · Plan to discuss a method of birth control with your physician at your post  "delivery follow up visit    POSTPARTUM BLUES  During the first few days after birth, you may experience a sense of the \"blues\" which may include impatience, irritability or even crying. These feelings come and go quickly. However, as many as 1 in 10 women experience emotional symptoms known as postpartum depression.     POSTPARTUM DEPRESSION    May start as early as the second or third day after delivery or take several weeks or months to develop. Symptoms of \"blues\" are present, but are more intense: Crying spells; loss of appetite; feelings of hopelessness or loss of control; fear of touching the baby; over concern or no concern at all about the baby; little or no concern about your own appearance/caring for yourself; and/or inability to sleep or excessive sleeping. Contact your Obstetrician if you are experiencing any of these symptoms     PREVENTING SHAKEN BABY  If you are angry or stressed, PUT THE BABY IN THE CRIB, step away, take some deep breaths, and wait until you are calm to care for the baby. DO NOT SHAKE THE BABY. You are not alone, call a supporter for help.  · [facility] 24/7 crisis call line (145-711-5710) or (1-984.187.5879)  · You can also text them, text \"ANSWER\" (623579)      "

## 2021-05-01 NOTE — DISCHARGE SUMMARY
Discharge summary    Date of admission:   April 29, 2021  Date of discharge:    May 1, 2021    Discharge diagnoses:  1-term intrauterine pregnancy   2-delivery viable female Apgars 8/9      Procedures:    1-induction with vaginal delivery      Hospital course:    Patient 30-year-old female para 2 who presented for induction just over 39 weeks.    Patient had an uncomplicated vaginal delivery.    Her postpartum course was benign with normal lochia.  Patient was subsequently discharged home      Progress Note    Subjective: Patient is doing well lochia is normal.  Has normal GI and  function.     Objective: Vital signs are normal  General: Patient's awake alert pleasant  Head: Atraumatic normocephalic  Abdomen: Fundus is firm, appropriate tenderness       Assessment:  Term intrauterine pregnancy  Postpartum day 2 after vaginal delivery    Plan:  DC home  F/u in 4-6 weeks  Activity infectious precautions reviewed    Discharge medications:  None

## 2021-05-01 NOTE — CARE PLAN
Problem: Safety  Goal: Will remain free from injury  Note: Remains free from injury, steady gait, ambulating in room and to bathroom, will call as needed for assistance     Problem: Discharge Barriers/Planning  Goal: Patient's continuum of care needs will be met  Note: Progressing according to continuum of care with needs being meet, assists for assistance as needed aware of discharge plan